# Patient Record
Sex: MALE | Race: WHITE | NOT HISPANIC OR LATINO | Employment: OTHER | URBAN - METROPOLITAN AREA
[De-identification: names, ages, dates, MRNs, and addresses within clinical notes are randomized per-mention and may not be internally consistent; named-entity substitution may affect disease eponyms.]

---

## 2023-08-28 PROBLEM — I10 HYPERTENSION: Status: ACTIVE | Noted: 2023-08-28

## 2023-08-28 PROBLEM — M17.9 OSTEOARTHRITIS OF KNEE: Status: ACTIVE | Noted: 2023-08-28

## 2023-08-28 PROBLEM — E11.9 TYPE 2 DIABETES MELLITUS (MULTI): Status: ACTIVE | Noted: 2023-08-28

## 2023-08-28 PROBLEM — E78.5 HYPERLIPIDEMIA: Status: ACTIVE | Noted: 2023-08-28

## 2023-08-28 PROBLEM — N52.9 ERECTILE DYSFUNCTION: Status: ACTIVE | Noted: 2023-08-28

## 2023-08-28 RX ORDER — GLIMEPIRIDE 2 MG/1
1 TABLET ORAL 2 TIMES DAILY
COMMUNITY
End: 2023-12-18

## 2023-08-28 RX ORDER — CHOLECALCIFEROL (VITAMIN D3) 50 MCG
1 TABLET ORAL
COMMUNITY
Start: 2011-08-16

## 2023-08-28 RX ORDER — ATORVASTATIN CALCIUM 40 MG/1
1 TABLET, FILM COATED ORAL DAILY
COMMUNITY
End: 2024-02-14

## 2023-08-28 RX ORDER — IRBESARTAN 150 MG/1
1 TABLET ORAL DAILY
COMMUNITY
End: 2024-01-04

## 2023-08-28 RX ORDER — FOLIC ACID 0.8 MG
TABLET ORAL DAILY
COMMUNITY

## 2023-08-28 RX ORDER — MELOXICAM 7.5 MG/1
1 TABLET ORAL DAILY PRN
COMMUNITY
End: 2023-12-27

## 2023-08-28 RX ORDER — FLUTICASONE PROPIONATE 50 MCG
2 SPRAY, SUSPENSION (ML) NASAL DAILY
COMMUNITY
Start: 2018-12-11 | End: 2024-05-21 | Stop reason: DRUGHIGH

## 2023-08-28 RX ORDER — METFORMIN HYDROCHLORIDE 500 MG/1
2 TABLET, EXTENDED RELEASE ORAL DAILY
COMMUNITY
End: 2023-12-15

## 2023-09-08 PROBLEM — J30.9 ALLERGIC RHINITIS: Status: ACTIVE | Noted: 2023-09-08

## 2023-09-08 PROBLEM — Z80.0 FAMILY HISTORY OF COLON CANCER: Status: ACTIVE | Noted: 2023-09-08

## 2023-11-21 ENCOUNTER — OFFICE VISIT (OUTPATIENT)
Dept: PRIMARY CARE | Facility: CLINIC | Age: 63
End: 2023-11-21
Payer: COMMERCIAL

## 2023-11-21 VITALS
WEIGHT: 232 LBS | BODY MASS INDEX: 32.48 KG/M2 | OXYGEN SATURATION: 97 % | HEIGHT: 71 IN | SYSTOLIC BLOOD PRESSURE: 120 MMHG | HEART RATE: 75 BPM | DIASTOLIC BLOOD PRESSURE: 78 MMHG | TEMPERATURE: 98.5 F

## 2023-11-21 DIAGNOSIS — E78.2 MIXED HYPERLIPIDEMIA: Chronic | ICD-10-CM

## 2023-11-21 DIAGNOSIS — I10 PRIMARY HYPERTENSION: Chronic | ICD-10-CM

## 2023-11-21 DIAGNOSIS — Z12.5 SCREENING FOR PROSTATE CANCER: ICD-10-CM

## 2023-11-21 DIAGNOSIS — Z00.00 ROUTINE MEDICAL EXAM: Primary | ICD-10-CM

## 2023-11-21 DIAGNOSIS — E11.9 TYPE 2 DIABETES MELLITUS WITHOUT COMPLICATION, WITHOUT LONG-TERM CURRENT USE OF INSULIN (MULTI): ICD-10-CM

## 2023-11-21 PROBLEM — Z80.0 FAMILY HISTORY OF COLON CANCER: Chronic | Status: ACTIVE | Noted: 2023-09-08

## 2023-11-21 PROBLEM — J30.9 ALLERGIC RHINITIS: Chronic | Status: ACTIVE | Noted: 2023-09-08

## 2023-11-21 PROBLEM — E78.5 HYPERLIPIDEMIA: Chronic | Status: ACTIVE | Noted: 2023-08-28

## 2023-11-21 PROCEDURE — 4010F ACE/ARB THERAPY RXD/TAKEN: CPT | Performed by: FAMILY MEDICINE

## 2023-11-21 PROCEDURE — 3078F DIAST BP <80 MM HG: CPT | Performed by: FAMILY MEDICINE

## 2023-11-21 PROCEDURE — 90686 IIV4 VACC NO PRSV 0.5 ML IM: CPT | Performed by: FAMILY MEDICINE

## 2023-11-21 PROCEDURE — 83036 HEMOGLOBIN GLYCOSYLATED A1C: CPT | Performed by: FAMILY MEDICINE

## 2023-11-21 PROCEDURE — 1036F TOBACCO NON-USER: CPT | Performed by: FAMILY MEDICINE

## 2023-11-21 PROCEDURE — 99396 PREV VISIT EST AGE 40-64: CPT | Performed by: FAMILY MEDICINE

## 2023-11-21 PROCEDURE — 3074F SYST BP LT 130 MM HG: CPT | Performed by: FAMILY MEDICINE

## 2023-11-21 ASSESSMENT — LIFESTYLE VARIABLES
AUDIT-C TOTAL SCORE: 2
SKIP TO QUESTIONS 9-10: 0
HOW MANY STANDARD DRINKS CONTAINING ALCOHOL DO YOU HAVE ON A TYPICAL DAY: 1 OR 2
HOW OFTEN DO YOU HAVE A DRINK CONTAINING ALCOHOL: MONTHLY OR LESS
HOW OFTEN DO YOU HAVE SIX OR MORE DRINKS ON ONE OCCASION: LESS THAN MONTHLY

## 2023-11-21 ASSESSMENT — ENCOUNTER SYMPTOMS
DEPRESSION: 0
COUGH: 0
PALPITATIONS: 0
ABDOMINAL DISTENTION: 0
LOSS OF SENSATION IN FEET: 0
OCCASIONAL FEELINGS OF UNSTEADINESS: 0
SHORTNESS OF BREATH: 0

## 2023-11-21 ASSESSMENT — PAIN SCALES - GENERAL: PAINLEVEL: 0-NO PAIN

## 2023-11-21 ASSESSMENT — PATIENT HEALTH QUESTIONNAIRE - PHQ9
2. FEELING DOWN, DEPRESSED OR HOPELESS: NOT AT ALL
SUM OF ALL RESPONSES TO PHQ9 QUESTIONS 1 AND 2: 0
1. LITTLE INTEREST OR PLEASURE IN DOING THINGS: NOT AT ALL

## 2023-11-21 NOTE — PROGRESS NOTES
"Subjective   Patient ID: Felice Mckenna is a 63 y.o. male who presents for No chief complaint on file..    HPI   DM2:   -Chronic , stable  -A1c today: 6.5  - Frequency of blood glucose monitoring: daily prn  -Hypoglycemic episodes: infrequent  -Concerns:  none  -Tolerating meds: yes  -Eye Exam: (recommend yearly)1 /23  -Foot Exam: (recommend yearly)  11/21/23  Hypertension :  -Patient is here for follow-up hypertension: chronic , stable  -Tolerating medications without side effects  -Patient denies chest pain.   Dyslipidemia  -Patient presents for follow up chronic ,stable problem  -Concerns: none   -Treatment:  atorvistatin    Review of Systems   Respiratory:  Negative for cough and shortness of breath.    Cardiovascular:  Negative for chest pain and palpitations.   Gastrointestinal:  Negative for abdominal distention.       Objective   /78 (BP Location: Left arm)   Pulse 75   Temp 36.9 °C (98.5 °F) (Temporal)   Ht 1.803 m (5' 11\")   Wt 105 kg (232 lb)   SpO2 97%   BMI 32.36 kg/m²     Physical Exam  Vitals reviewed.   Constitutional:       Appearance: Normal appearance.   Cardiovascular:      Rate and Rhythm: Normal rate and regular rhythm.      Pulses:           Dorsalis pedis pulses are 2+ on the right side and 2+ on the left side.        Posterior tibial pulses are 2+ on the right side and 2+ on the left side.      Heart sounds: Normal heart sounds. No murmur heard.  Pulmonary:      Breath sounds: Normal breath sounds.   Abdominal:      General: Abdomen is flat. Bowel sounds are normal.      Palpations: Abdomen is soft.   Musculoskeletal:         General: No swelling.   Feet:      Right foot:      Protective Sensation: 6 sites tested.        Skin integrity: Skin integrity normal.      Left foot:      Protective Sensation: 6 sites tested.        Skin integrity: Skin integrity normal.   Neurological:      Mental Status: He is alert.         Assessment/Plan   Diagnoses and all orders for this " visit:  Routine medical exam  -     Urinalysis with Reflex Microscopic and Culture; Future  -     Albumin , Urine Random; Future  -     Comprehensive Metabolic Panel; Future  -     CBC and Auto Differential; Future  -     TSH with reflex to Free T4 if abnormal; Future  -     Lipid Panel; Future  Type 2 diabetes mellitus without complication, without long-term current use of insulin (CMS/HCC)  -     POCT glycosylated hemoglobin (Hb A1C) manually resulted  -     Urinalysis with Reflex Microscopic and Culture; Future  -     Albumin , Urine Random; Future  -     Comprehensive Metabolic Panel; Future  -     CBC and Auto Differential; Future  -     TSH with reflex to Free T4 if abnormal; Future  -     Lipid Panel; Future  Mixed hyperlipidemia  Primary hypertension  Screening for prostate cancer  -     Prostate Specific Antigen, Screen; Future  Other orders  -     Flu vaccine (IIV4) age 6 months and greater, preservative free    Rto 6mo dm2  Cont meds as directed

## 2023-11-22 LAB — POC HEMOGLOBIN A1C: 6.5 % (ref 4.2–6.5)

## 2023-12-15 DIAGNOSIS — E11.9 TYPE 2 DIABETES MELLITUS WITHOUT COMPLICATION, WITHOUT LONG-TERM CURRENT USE OF INSULIN (MULTI): Primary | ICD-10-CM

## 2023-12-15 RX ORDER — METFORMIN HYDROCHLORIDE 500 MG/1
1000 TABLET, EXTENDED RELEASE ORAL DAILY
Qty: 180 TABLET | Refills: 3 | Status: SHIPPED | OUTPATIENT
Start: 2023-12-15

## 2023-12-18 DIAGNOSIS — E11.9 TYPE 2 DIABETES MELLITUS WITHOUT COMPLICATION, WITHOUT LONG-TERM CURRENT USE OF INSULIN (MULTI): Primary | ICD-10-CM

## 2023-12-18 RX ORDER — GLIMEPIRIDE 2 MG/1
2 TABLET ORAL 2 TIMES DAILY
Qty: 60 TABLET | Refills: 5 | Status: SHIPPED | OUTPATIENT
Start: 2023-12-18 | End: 2024-06-06

## 2023-12-27 DIAGNOSIS — M17.10 PRIMARY OSTEOARTHRITIS OF KNEE, UNSPECIFIED LATERALITY: Primary | ICD-10-CM

## 2023-12-27 RX ORDER — MELOXICAM 7.5 MG/1
TABLET ORAL
Qty: 30 TABLET | Refills: 11 | Status: SHIPPED | OUTPATIENT
Start: 2023-12-27

## 2024-01-04 DIAGNOSIS — I10 PRIMARY HYPERTENSION: Primary | Chronic | ICD-10-CM

## 2024-01-04 RX ORDER — IRBESARTAN 150 MG/1
150 TABLET ORAL DAILY
Qty: 90 TABLET | Refills: 3 | Status: SHIPPED | OUTPATIENT
Start: 2024-01-04

## 2024-02-09 ENCOUNTER — LAB (OUTPATIENT)
Dept: LAB | Facility: LAB | Age: 64
End: 2024-02-09
Payer: COMMERCIAL

## 2024-02-09 DIAGNOSIS — Z12.5 SCREENING FOR PROSTATE CANCER: ICD-10-CM

## 2024-02-09 DIAGNOSIS — E11.9 TYPE 2 DIABETES MELLITUS WITHOUT COMPLICATION, WITHOUT LONG-TERM CURRENT USE OF INSULIN (MULTI): ICD-10-CM

## 2024-02-09 DIAGNOSIS — Z00.00 ROUTINE MEDICAL EXAM: ICD-10-CM

## 2024-02-09 LAB
ALBUMIN SERPL BCP-MCNC: 4.4 G/DL (ref 3.4–5)
ALP SERPL-CCNC: 103 U/L (ref 33–136)
ALT SERPL W P-5'-P-CCNC: 35 U/L (ref 10–52)
ANION GAP SERPL CALC-SCNC: 14 MMOL/L (ref 10–20)
APPEARANCE UR: CLEAR
AST SERPL W P-5'-P-CCNC: 20 U/L (ref 9–39)
BASOPHILS # BLD AUTO: 0.06 X10*3/UL (ref 0–0.1)
BASOPHILS NFR BLD AUTO: 1 %
BILIRUB SERPL-MCNC: 0.5 MG/DL (ref 0–1.2)
BILIRUB UR STRIP.AUTO-MCNC: NEGATIVE MG/DL
BUN SERPL-MCNC: 23 MG/DL (ref 6–23)
CALCIUM SERPL-MCNC: 9.6 MG/DL (ref 8.6–10.3)
CHLORIDE SERPL-SCNC: 103 MMOL/L (ref 98–107)
CHOLEST SERPL-MCNC: 150 MG/DL (ref 0–199)
CHOLESTEROL/HDL RATIO: 2.8
CO2 SERPL-SCNC: 25 MMOL/L (ref 21–32)
COLOR UR: YELLOW
CREAT SERPL-MCNC: 0.98 MG/DL (ref 0.5–1.3)
EGFRCR SERPLBLD CKD-EPI 2021: 87 ML/MIN/1.73M*2
EOSINOPHIL # BLD AUTO: 0.18 X10*3/UL (ref 0–0.7)
EOSINOPHIL NFR BLD AUTO: 3 %
ERYTHROCYTE [DISTWIDTH] IN BLOOD BY AUTOMATED COUNT: 12.5 % (ref 11.5–14.5)
GLUCOSE SERPL-MCNC: 165 MG/DL (ref 74–99)
GLUCOSE UR STRIP.AUTO-MCNC: NEGATIVE MG/DL
HCT VFR BLD AUTO: 44.4 % (ref 41–52)
HDLC SERPL-MCNC: 53.8 MG/DL
HGB BLD-MCNC: 15.4 G/DL (ref 13.5–17.5)
HOLD SPECIMEN: NORMAL
IMM GRANULOCYTES # BLD AUTO: 0.02 X10*3/UL (ref 0–0.7)
IMM GRANULOCYTES NFR BLD AUTO: 0.3 % (ref 0–0.9)
KETONES UR STRIP.AUTO-MCNC: ABNORMAL MG/DL
LDLC SERPL CALC-MCNC: 78 MG/DL
LEUKOCYTE ESTERASE UR QL STRIP.AUTO: NEGATIVE
LYMPHOCYTES # BLD AUTO: 2.1 X10*3/UL (ref 1.2–4.8)
LYMPHOCYTES NFR BLD AUTO: 34.8 %
MCH RBC QN AUTO: 31 PG (ref 26–34)
MCHC RBC AUTO-ENTMCNC: 34.7 G/DL (ref 32–36)
MCV RBC AUTO: 89 FL (ref 80–100)
MONOCYTES # BLD AUTO: 0.64 X10*3/UL (ref 0.1–1)
MONOCYTES NFR BLD AUTO: 10.6 %
NEUTROPHILS # BLD AUTO: 3.03 X10*3/UL (ref 1.2–7.7)
NEUTROPHILS NFR BLD AUTO: 50.3 %
NITRITE UR QL STRIP.AUTO: NEGATIVE
NON HDL CHOLESTEROL: 96 MG/DL (ref 0–149)
NRBC BLD-RTO: 0 /100 WBCS (ref 0–0)
PH UR STRIP.AUTO: 5 [PH]
PLATELET # BLD AUTO: 209 X10*3/UL (ref 150–450)
POTASSIUM SERPL-SCNC: 4.3 MMOL/L (ref 3.5–5.3)
PROT SERPL-MCNC: 6.9 G/DL (ref 6.4–8.2)
PROT UR STRIP.AUTO-MCNC: NEGATIVE MG/DL
RBC # BLD AUTO: 4.97 X10*6/UL (ref 4.5–5.9)
RBC # UR STRIP.AUTO: NEGATIVE /UL
SODIUM SERPL-SCNC: 138 MMOL/L (ref 136–145)
SP GR UR STRIP.AUTO: 1.02
TRIGL SERPL-MCNC: 91 MG/DL (ref 0–149)
TSH SERPL-ACNC: 1.96 MIU/L (ref 0.44–3.98)
UROBILINOGEN UR STRIP.AUTO-MCNC: <2 MG/DL
VLDL: 18 MG/DL (ref 0–40)
WBC # BLD AUTO: 6 X10*3/UL (ref 4.4–11.3)

## 2024-02-09 PROCEDURE — 80061 LIPID PANEL: CPT

## 2024-02-09 PROCEDURE — 81003 URINALYSIS AUTO W/O SCOPE: CPT

## 2024-02-09 PROCEDURE — 80053 COMPREHEN METABOLIC PANEL: CPT

## 2024-02-09 PROCEDURE — 82570 ASSAY OF URINE CREATININE: CPT

## 2024-02-09 PROCEDURE — 84443 ASSAY THYROID STIM HORMONE: CPT

## 2024-02-09 PROCEDURE — 82043 UR ALBUMIN QUANTITATIVE: CPT

## 2024-02-09 PROCEDURE — 84153 ASSAY OF PSA TOTAL: CPT

## 2024-02-09 PROCEDURE — 85025 COMPLETE CBC W/AUTO DIFF WBC: CPT

## 2024-02-10 LAB
CREAT UR-MCNC: 119.4 MG/DL (ref 20–370)
MICROALBUMIN UR-MCNC: <7 MG/L
MICROALBUMIN/CREAT UR: NORMAL MG/G{CREAT}
PSA SERPL-MCNC: 0.7 NG/ML

## 2024-02-14 DIAGNOSIS — E78.2 MIXED HYPERLIPIDEMIA: Primary | Chronic | ICD-10-CM

## 2024-02-14 RX ORDER — ATORVASTATIN CALCIUM 40 MG/1
40 TABLET, FILM COATED ORAL DAILY
Qty: 90 TABLET | Refills: 1 | Status: SHIPPED | OUTPATIENT
Start: 2024-02-14

## 2024-05-21 ENCOUNTER — OFFICE VISIT (OUTPATIENT)
Dept: PRIMARY CARE | Facility: CLINIC | Age: 64
End: 2024-05-21
Payer: COMMERCIAL

## 2024-05-21 VITALS
TEMPERATURE: 98.4 F | WEIGHT: 234.6 LBS | OXYGEN SATURATION: 98 % | HEART RATE: 88 BPM | SYSTOLIC BLOOD PRESSURE: 134 MMHG | DIASTOLIC BLOOD PRESSURE: 86 MMHG | BODY MASS INDEX: 32.84 KG/M2 | HEIGHT: 71 IN

## 2024-05-21 DIAGNOSIS — E11.9 TYPE 2 DIABETES MELLITUS WITHOUT COMPLICATION, WITHOUT LONG-TERM CURRENT USE OF INSULIN (MULTI): ICD-10-CM

## 2024-05-21 DIAGNOSIS — E78.2 MIXED HYPERLIPIDEMIA: Chronic | ICD-10-CM

## 2024-05-21 DIAGNOSIS — I10 PRIMARY HYPERTENSION: Primary | Chronic | ICD-10-CM

## 2024-05-21 PROBLEM — N52.9 ERECTILE DYSFUNCTION: Chronic | Status: ACTIVE | Noted: 2023-08-28

## 2024-05-21 LAB — POC HEMOGLOBIN A1C: 7.8 % (ref 4.2–6.5)

## 2024-05-21 PROCEDURE — 4010F ACE/ARB THERAPY RXD/TAKEN: CPT | Performed by: FAMILY MEDICINE

## 2024-05-21 PROCEDURE — 1036F TOBACCO NON-USER: CPT | Performed by: FAMILY MEDICINE

## 2024-05-21 PROCEDURE — 99214 OFFICE O/P EST MOD 30 MIN: CPT | Performed by: FAMILY MEDICINE

## 2024-05-21 PROCEDURE — 83036 HEMOGLOBIN GLYCOSYLATED A1C: CPT | Mod: 91 | Performed by: FAMILY MEDICINE

## 2024-05-21 PROCEDURE — 3062F POS MACROALBUMINURIA REV: CPT | Performed by: FAMILY MEDICINE

## 2024-05-21 PROCEDURE — 3048F LDL-C <100 MG/DL: CPT | Performed by: FAMILY MEDICINE

## 2024-05-21 PROCEDURE — 3075F SYST BP GE 130 - 139MM HG: CPT | Performed by: FAMILY MEDICINE

## 2024-05-21 PROCEDURE — 3079F DIAST BP 80-89 MM HG: CPT | Performed by: FAMILY MEDICINE

## 2024-05-21 RX ORDER — FLUTICASONE PROPIONATE 50 MCG
2 SPRAY, SUSPENSION (ML) NASAL DAILY PRN
Qty: 16 G | Refills: 1 | Status: SHIPPED | OUTPATIENT
Start: 2024-05-21

## 2024-05-21 ASSESSMENT — PATIENT HEALTH QUESTIONNAIRE - PHQ9
SUM OF ALL RESPONSES TO PHQ9 QUESTIONS 1 AND 2: 0
1. LITTLE INTEREST OR PLEASURE IN DOING THINGS: NOT AT ALL
2. FEELING DOWN, DEPRESSED OR HOPELESS: NOT AT ALL

## 2024-05-21 ASSESSMENT — ENCOUNTER SYMPTOMS
DEPRESSION: 0
LOSS OF SENSATION IN FEET: 0
SHORTNESS OF BREATH: 0
ABDOMINAL DISTENTION: 0
COUGH: 0
PALPITATIONS: 0
OCCASIONAL FEELINGS OF UNSTEADINESS: 0

## 2024-05-21 ASSESSMENT — COLUMBIA-SUICIDE SEVERITY RATING SCALE - C-SSRS
1. IN THE PAST MONTH, HAVE YOU WISHED YOU WERE DEAD OR WISHED YOU COULD GO TO SLEEP AND NOT WAKE UP?: NO
6. HAVE YOU EVER DONE ANYTHING, STARTED TO DO ANYTHING, OR PREPARED TO DO ANYTHING TO END YOUR LIFE?: NO
2. HAVE YOU ACTUALLY HAD ANY THOUGHTS OF KILLING YOURSELF?: NO

## 2024-05-21 ASSESSMENT — PAIN SCALES - GENERAL: PAINLEVEL: 0-NO PAIN

## 2024-05-21 NOTE — PROGRESS NOTES
"Subjective   Patient ID: Felice Mckenna is a 63 y.o. male who presents for No chief complaint on file..    HPI   DM2:   -Chronic , stable  -A1c today: 7.8  - Frequency of blood glucose monitorin-2 /wk  fbg 150  -Hypoglycemic episodes: infrequent  -Concerns:  none  -Tolerating meds: yes  Hypertension :   -Patient is here for follow-up hypertension: chronic , stable  -Tolerating medications without side effects  Dyslipidemia  -Patient presents for follow up chronic ,stable problem  -Concerns: none   -Treatment:          Review of Systems   Respiratory:  Negative for cough and shortness of breath.    Cardiovascular:  Negative for chest pain and palpitations.   Gastrointestinal:  Negative for abdominal distention.       Objective   /86   Pulse 88   Temp 36.9 °C (98.4 °F) (Temporal)   Ht 1.803 m (5' 11\")   Wt 106 kg (234 lb 9.6 oz)   SpO2 98%   BMI 32.72 kg/m²     Physical Exam  Vitals reviewed.   Constitutional:       Appearance: Normal appearance.   Cardiovascular:      Rate and Rhythm: Normal rate and regular rhythm.      Heart sounds: Normal heart sounds. No murmur heard.  Pulmonary:      Breath sounds: Normal breath sounds.   Abdominal:      General: Abdomen is flat. Bowel sounds are normal.      Palpations: Abdomen is soft.   Musculoskeletal:         General: No swelling.   Neurological:      Mental Status: He is alert.         Assessment/Plan   Diagnoses and all orders for this visit:  Primary hypertension  Type 2 diabetes mellitus without complication, without long-term current use of insulin (Multi)  -     POCT glycosylated hemoglobin (Hb A1C) manually resulted  Mixed hyperlipidemia  Other orders  -     Follow Up In Primary Care - Established  -     fluticasone (Flonase) 50 mcg/actuation nasal spray; Administer 2 sprays into each nostril once daily as needed for rhinitis.  -     Follow Up In Primary Care - Health Maintenance; Future       Rto 6mo  Cont meds  A1c is slightly elevated today " compared to last visit  Recommend he increase his exercise and recommend weight loss  Did advise and recommend a change in therapy of his medications he wanted to hold off and see what happens over the next 6 months.

## 2024-06-06 DIAGNOSIS — E11.9 TYPE 2 DIABETES MELLITUS WITHOUT COMPLICATION, WITHOUT LONG-TERM CURRENT USE OF INSULIN (MULTI): ICD-10-CM

## 2024-06-06 RX ORDER — GLIMEPIRIDE 2 MG/1
2 TABLET ORAL 2 TIMES DAILY
Qty: 60 TABLET | Refills: 5 | Status: SHIPPED | OUTPATIENT
Start: 2024-06-06

## 2024-11-26 ENCOUNTER — OFFICE VISIT (OUTPATIENT)
Dept: PRIMARY CARE | Facility: CLINIC | Age: 64
End: 2024-11-26
Payer: COMMERCIAL

## 2024-11-26 VITALS
OXYGEN SATURATION: 98 % | SYSTOLIC BLOOD PRESSURE: 136 MMHG | TEMPERATURE: 98.1 F | HEIGHT: 71 IN | DIASTOLIC BLOOD PRESSURE: 72 MMHG | WEIGHT: 239 LBS | BODY MASS INDEX: 33.46 KG/M2 | HEART RATE: 95 BPM

## 2024-11-26 DIAGNOSIS — I10 PRIMARY HYPERTENSION: Chronic | ICD-10-CM

## 2024-11-26 DIAGNOSIS — Z00.00 ROUTINE MEDICAL EXAM: ICD-10-CM

## 2024-11-26 DIAGNOSIS — E11.9 TYPE 2 DIABETES MELLITUS WITHOUT COMPLICATION, WITHOUT LONG-TERM CURRENT USE OF INSULIN (MULTI): ICD-10-CM

## 2024-11-26 DIAGNOSIS — Z23 ENCOUNTER FOR IMMUNIZATION: ICD-10-CM

## 2024-11-26 DIAGNOSIS — Z23 ENCOUNTER FOR PREVNAR PNEUMOCOCCAL VACCINATION: Primary | ICD-10-CM

## 2024-11-26 DIAGNOSIS — Z12.5 SCREENING FOR PROSTATE CANCER: ICD-10-CM

## 2024-11-26 DIAGNOSIS — Z80.0 FAMILY HISTORY OF COLON CANCER: Chronic | ICD-10-CM

## 2024-11-26 DIAGNOSIS — E78.2 MIXED HYPERLIPIDEMIA: Chronic | ICD-10-CM

## 2024-11-26 LAB — POC HEMOGLOBIN A1C: 7.6 % (ref 4.2–6.5)

## 2024-11-26 PROCEDURE — 3075F SYST BP GE 130 - 139MM HG: CPT | Performed by: FAMILY MEDICINE

## 2024-11-26 PROCEDURE — 90471 IMMUNIZATION ADMIN: CPT | Performed by: FAMILY MEDICINE

## 2024-11-26 PROCEDURE — 3078F DIAST BP <80 MM HG: CPT | Performed by: FAMILY MEDICINE

## 2024-11-26 PROCEDURE — 90656 IIV3 VACC NO PRSV 0.5 ML IM: CPT | Performed by: FAMILY MEDICINE

## 2024-11-26 PROCEDURE — 1036F TOBACCO NON-USER: CPT | Performed by: FAMILY MEDICINE

## 2024-11-26 PROCEDURE — 4010F ACE/ARB THERAPY RXD/TAKEN: CPT | Performed by: FAMILY MEDICINE

## 2024-11-26 PROCEDURE — 3062F POS MACROALBUMINURIA REV: CPT | Performed by: FAMILY MEDICINE

## 2024-11-26 PROCEDURE — 3048F LDL-C <100 MG/DL: CPT | Performed by: FAMILY MEDICINE

## 2024-11-26 PROCEDURE — 83036 HEMOGLOBIN GLYCOSYLATED A1C: CPT | Performed by: FAMILY MEDICINE

## 2024-11-26 PROCEDURE — 90677 PCV20 VACCINE IM: CPT | Performed by: FAMILY MEDICINE

## 2024-11-26 PROCEDURE — 90472 IMMUNIZATION ADMIN EACH ADD: CPT | Performed by: FAMILY MEDICINE

## 2024-11-26 PROCEDURE — 3008F BODY MASS INDEX DOCD: CPT | Performed by: FAMILY MEDICINE

## 2024-11-26 PROCEDURE — 99396 PREV VISIT EST AGE 40-64: CPT | Performed by: FAMILY MEDICINE

## 2024-11-26 ASSESSMENT — ENCOUNTER SYMPTOMS
ABDOMINAL DISTENTION: 0
LOSS OF SENSATION IN FEET: 0
OCCASIONAL FEELINGS OF UNSTEADINESS: 0
PALPITATIONS: 0
COUGH: 0
SHORTNESS OF BREATH: 0
DEPRESSION: 0

## 2024-11-26 ASSESSMENT — LIFESTYLE VARIABLES
SKIP TO QUESTIONS 9-10: 0
HOW OFTEN DO YOU HAVE A DRINK CONTAINING ALCOHOL: 2-3 TIMES A WEEK
HOW MANY STANDARD DRINKS CONTAINING ALCOHOL DO YOU HAVE ON A TYPICAL DAY: 1 OR 2
AUDIT-C TOTAL SCORE: 7
HOW OFTEN DO YOU HAVE SIX OR MORE DRINKS ON ONE OCCASION: DAILY OR ALMOST DAILY

## 2024-11-26 ASSESSMENT — PAIN SCALES - GENERAL: PAINLEVEL_OUTOF10: 0-NO PAIN

## 2024-11-26 ASSESSMENT — PATIENT HEALTH QUESTIONNAIRE - PHQ9
SUM OF ALL RESPONSES TO PHQ9 QUESTIONS 1 AND 2: 0
2. FEELING DOWN, DEPRESSED OR HOPELESS: NOT AT ALL
1. LITTLE INTEREST OR PLEASURE IN DOING THINGS: NOT AT ALL

## 2024-11-26 NOTE — PROGRESS NOTES
"Subjective   Patient ID: Felice Mckenna is a 64 y.o. male who presents for Annual Exam.    HPI   DM2:   -Chronic , stable  -A1c today: 7.6,will increase exercise  - Frequency of blood glucose monitoring:  -Hypoglycemic episodes: infrequent  -Concerns:  none  -Tolerating meds: yes  -Eye Exam: (recommend yearly) 9/2024  -Foot Exam: (recommend yearly) 11/2024    Dyslipidemia  -Patient presents for follow up chronic ,stable problem  -Concerns: none   -Treatment: Atorvastatin 40mg    Hypertension :   -Patient is here for follow-up hypertension: chronic , stable  -Tolerating medications without side effects    Review of Systems   Respiratory:  Negative for cough and shortness of breath.    Cardiovascular:  Negative for chest pain and palpitations.   Gastrointestinal:  Negative for abdominal distention.       Objective   /72   Pulse 95   Temp 36.7 °C (98.1 °F)   Ht 1.803 m (5' 11\")   Wt 108 kg (239 lb)   SpO2 98%   BMI 33.33 kg/m²     Physical Exam  Vitals reviewed.   Constitutional:       Appearance: Normal appearance.   HENT:      Right Ear: Tympanic membrane, ear canal and external ear normal.      Left Ear: Tympanic membrane, ear canal and external ear normal.      Nose: Nose normal.      Mouth/Throat:      Mouth: Mucous membranes are moist.   Eyes:      Extraocular Movements: Extraocular movements intact.      Conjunctiva/sclera: Conjunctivae normal.      Pupils: Pupils are equal, round, and reactive to light.   Cardiovascular:      Rate and Rhythm: Normal rate and regular rhythm.      Pulses: Normal pulses.           Dorsalis pedis pulses are 2+ on the right side and 2+ on the left side.        Posterior tibial pulses are 2+ on the right side and 2+ on the left side.      Heart sounds: Normal heart sounds.   Pulmonary:      Breath sounds: Normal breath sounds.   Abdominal:      General: Abdomen is flat. Bowel sounds are normal.      Palpations: Abdomen is soft.   Genitourinary:     Penis: Normal.       " Testes: Normal.      Prostate: Enlarged. Not tender and no nodules present.      Rectum: Normal.   Musculoskeletal:         General: Normal range of motion.      Cervical back: Neck supple.   Feet:      Right foot:      Protective Sensation: 6 sites tested.        Skin integrity: Skin integrity normal.      Left foot:      Protective Sensation: 6 sites tested.        Skin integrity: Skin integrity normal.   Skin:     General: Skin is warm.   Neurological:      General: No focal deficit present.      Mental Status: He is alert and oriented to person, place, and time.      Cranial Nerves: Cranial nerves 2-12 are intact.   Psychiatric:         Attention and Perception: Attention normal.         Mood and Affect: Mood normal.         Assessment/Plan   Diagnoses and all orders for this visit:  Encounter for Prevnar pneumococcal vaccination  Encounter for immunization  -     Flu vaccine, trivalent, preservative free, age 6 months and greater (Fluarix/Fluzone/Flulaval)  -     Pneumococcal conjugate vaccine, 20-valent (PREVNAR 20)  Type 2 diabetes mellitus without complication, without long-term current use of insulin (Multi)  -     POCT glycosylated hemoglobin (Hb A1C) manually resulted  -     Urinalysis with Reflex Microscopic; Future  -     Albumin-Creatinine Ratio, Urine Random; Future  -     Comprehensive Metabolic Panel; Future  -     CBC and Auto Differential; Future  -     TSH with reflex to Free T4 if abnormal; Future  -     Lipid Panel; Future  -     Prostate Specific Antigen, Screen; Future  Routine medical exam  -     Pneumococcal conjugate vaccine, 20-valent (PREVNAR 20)  -     Urinalysis with Reflex Microscopic; Future  -     Albumin-Creatinine Ratio, Urine Random; Future  -     Comprehensive Metabolic Panel; Future  -     CBC and Auto Differential; Future  -     TSH with reflex to Free T4 if abnormal; Future  -     Lipid Panel; Future  -     Prostate Specific Antigen, Screen; Future  Primary  hypertension  Family history of colon cancer  -     Referral to Gastroenterology; Future  Mixed hyperlipidemia  Screening for prostate cancer  -     Prostate Specific Antigen, Screen; Future  Other orders  -     Follow Up In Primary Care - Health Maintenance       Rto 6mo  Weight loss  Cont meds  C scope inf summer

## 2024-11-30 DIAGNOSIS — E11.9 TYPE 2 DIABETES MELLITUS WITHOUT COMPLICATION, WITHOUT LONG-TERM CURRENT USE OF INSULIN (MULTI): ICD-10-CM

## 2024-12-01 RX ORDER — GLIMEPIRIDE 2 MG/1
2 TABLET ORAL 2 TIMES DAILY
Qty: 180 TABLET | Refills: 1 | Status: SHIPPED | OUTPATIENT
Start: 2024-12-01

## 2024-12-02 DIAGNOSIS — E11.9 TYPE 2 DIABETES MELLITUS WITHOUT COMPLICATION, WITHOUT LONG-TERM CURRENT USE OF INSULIN (MULTI): ICD-10-CM

## 2024-12-02 RX ORDER — METFORMIN HYDROCHLORIDE 500 MG/1
1000 TABLET, EXTENDED RELEASE ORAL DAILY
Qty: 180 TABLET | Refills: 3 | Status: SHIPPED | OUTPATIENT
Start: 2024-12-02

## 2024-12-31 DIAGNOSIS — I10 PRIMARY HYPERTENSION: Chronic | ICD-10-CM

## 2024-12-31 DIAGNOSIS — M17.10 PRIMARY OSTEOARTHRITIS OF KNEE, UNSPECIFIED LATERALITY: ICD-10-CM

## 2024-12-31 RX ORDER — IRBESARTAN 150 MG/1
150 TABLET ORAL DAILY
Qty: 90 TABLET | Refills: 3 | Status: SHIPPED | OUTPATIENT
Start: 2024-12-31

## 2024-12-31 RX ORDER — MELOXICAM 7.5 MG/1
7.5 TABLET ORAL DAILY
Qty: 30 TABLET | Refills: 11 | Status: SHIPPED | OUTPATIENT
Start: 2024-12-31

## 2025-01-08 ENCOUNTER — OFFICE VISIT (OUTPATIENT)
Dept: PRIMARY CARE | Facility: CLINIC | Age: 65
End: 2025-01-08
Payer: COMMERCIAL

## 2025-01-08 VITALS
DIASTOLIC BLOOD PRESSURE: 89 MMHG | WEIGHT: 238.4 LBS | HEIGHT: 70 IN | SYSTOLIC BLOOD PRESSURE: 130 MMHG | TEMPERATURE: 97.7 F | OXYGEN SATURATION: 100 % | HEART RATE: 76 BPM | BODY MASS INDEX: 34.13 KG/M2

## 2025-01-08 DIAGNOSIS — H81.12 BENIGN PAROXYSMAL POSITIONAL VERTIGO OF LEFT EAR: Primary | ICD-10-CM

## 2025-01-08 PROCEDURE — 4010F ACE/ARB THERAPY RXD/TAKEN: CPT | Performed by: FAMILY MEDICINE

## 2025-01-08 PROCEDURE — 1036F TOBACCO NON-USER: CPT | Performed by: FAMILY MEDICINE

## 2025-01-08 PROCEDURE — 3075F SYST BP GE 130 - 139MM HG: CPT | Performed by: FAMILY MEDICINE

## 2025-01-08 PROCEDURE — 99213 OFFICE O/P EST LOW 20 MIN: CPT | Performed by: FAMILY MEDICINE

## 2025-01-08 PROCEDURE — 3008F BODY MASS INDEX DOCD: CPT | Performed by: FAMILY MEDICINE

## 2025-01-08 PROCEDURE — 3079F DIAST BP 80-89 MM HG: CPT | Performed by: FAMILY MEDICINE

## 2025-01-08 ASSESSMENT — ENCOUNTER SYMPTOMS
VOMITING: 0
DIZZINESS: 1
HEADACHES: 1
DEPRESSION: 0
DIAPHORESIS: 0
SWOLLEN GLANDS: 0
OCCASIONAL FEELINGS OF UNSTEADINESS: 0
NAUSEA: 1
LOSS OF SENSATION IN FEET: 0
SORE THROAT: 0
FEVER: 0

## 2025-01-08 ASSESSMENT — PATIENT HEALTH QUESTIONNAIRE - PHQ9
1. LITTLE INTEREST OR PLEASURE IN DOING THINGS: NOT AT ALL
2. FEELING DOWN, DEPRESSED OR HOPELESS: NOT AT ALL
SUM OF ALL RESPONSES TO PHQ9 QUESTIONS 1 AND 2: 0

## 2025-01-08 ASSESSMENT — PAIN SCALES - GENERAL: PAINLEVEL_OUTOF10: 0-NO PAIN

## 2025-01-08 ASSESSMENT — LIFESTYLE VARIABLES
HOW OFTEN DO YOU HAVE A DRINK CONTAINING ALCOHOL: MONTHLY OR LESS
HOW OFTEN DO YOU HAVE SIX OR MORE DRINKS ON ONE OCCASION: LESS THAN MONTHLY
SKIP TO QUESTIONS 9-10: 0
AUDIT-C TOTAL SCORE: 2
HOW MANY STANDARD DRINKS CONTAINING ALCOHOL DO YOU HAVE ON A TYPICAL DAY: 1 OR 2

## 2025-01-08 ASSESSMENT — COLUMBIA-SUICIDE SEVERITY RATING SCALE - C-SSRS
2. HAVE YOU ACTUALLY HAD ANY THOUGHTS OF KILLING YOURSELF?: NO
1. IN THE PAST MONTH, HAVE YOU WISHED YOU WERE DEAD OR WISHED YOU COULD GO TO SLEEP AND NOT WAKE UP?: NO
6. HAVE YOU EVER DONE ANYTHING, STARTED TO DO ANYTHING, OR PREPARED TO DO ANYTHING TO END YOUR LIFE?: NO

## 2025-01-08 NOTE — PROGRESS NOTES
"Subjective   Patient ID: Felice Mckenna is a 64 y.o. male who presents for Dizziness (X1 mth with nausea).    Dizziness  This is a new problem. The current episode started more than 1 month ago. Associated symptoms include headaches and nausea. Pertinent negatives include no diaphoresis, fever, rash, sore throat, swollen glands or vomiting. The treatment provided mild relief.      Intermittent symptoms of dizziness with change in head position, symptoms fatigue after less than 30 seconds, negative for chest pain or shortness of breath  Symptoms have been lasting for proxy about 1 month  Does notice that maybe when he changes or flips over in bed that these seem to trigger his symptoms  Review of Systems   Constitutional:  Negative for diaphoresis and fever.   HENT:  Negative for sore throat.    Gastrointestinal:  Positive for nausea. Negative for vomiting.   Skin:  Negative for rash.   Neurological:  Positive for dizziness and headaches.       Objective   /90   Pulse 76   Temp 36.5 °C (97.7 °F)   Ht 1.778 m (5' 10\")   Wt 108 kg (238 lb 6.4 oz)   SpO2 100%   BMI 34.21 kg/m²     Physical Exam  Vitals reviewed.   Constitutional:       Appearance: Normal appearance.   HENT:      Head: Normocephalic and atraumatic.      Jaw: There is normal jaw occlusion.      Right Ear: Tympanic membrane and ear canal normal.      Left Ear: Tympanic membrane and ear canal normal.      Mouth/Throat:      Mouth: Mucous membranes are moist.      Pharynx: Oropharynx is clear. Uvula midline.   Cardiovascular:      Rate and Rhythm: Normal rate and regular rhythm.      Heart sounds: Normal heart sounds. No murmur heard.  Pulmonary:      Breath sounds: Normal breath sounds.   Abdominal:      General: Abdomen is flat. Bowel sounds are normal.      Palpations: Abdomen is soft.   Musculoskeletal:         General: No swelling.   Neurological:      Mental Status: He is alert.     Positive [place]-Hallpike maneuver to the left  Negative [place] " Hile maneuver to the right  No signs of vertical horizontal or rotary nystagmus  Carotids were normal bilaterally      Assessment/Plan   Diagnoses and all orders for this visit:  Benign paroxysmal positional vertigo of left ear    Patient sent home with Epley's maneuvers  Return to office symptoms worsen  Counseled on warning signs and symptoms if they occur he is to come back and be seen  Continue his current medications

## 2025-01-24 ENCOUNTER — LAB (OUTPATIENT)
Dept: LAB | Facility: LAB | Age: 65
End: 2025-01-24
Payer: COMMERCIAL

## 2025-01-24 DIAGNOSIS — Z12.5 SCREENING FOR PROSTATE CANCER: ICD-10-CM

## 2025-01-24 DIAGNOSIS — Z00.00 ROUTINE MEDICAL EXAM: ICD-10-CM

## 2025-01-24 DIAGNOSIS — E11.9 TYPE 2 DIABETES MELLITUS WITHOUT COMPLICATION, WITHOUT LONG-TERM CURRENT USE OF INSULIN (MULTI): ICD-10-CM

## 2025-01-24 LAB
ALBUMIN SERPL BCP-MCNC: 4.4 G/DL (ref 3.4–5)
ALP SERPL-CCNC: 85 U/L (ref 33–136)
ALT SERPL W P-5'-P-CCNC: 44 U/L (ref 10–52)
ANION GAP SERPL CALC-SCNC: 13 MMOL/L (ref 10–20)
APPEARANCE UR: CLEAR
AST SERPL W P-5'-P-CCNC: 23 U/L (ref 9–39)
BASOPHILS # BLD AUTO: 0.04 X10*3/UL (ref 0–0.1)
BASOPHILS NFR BLD AUTO: 0.6 %
BILIRUB SERPL-MCNC: 0.6 MG/DL (ref 0–1.2)
BILIRUB UR STRIP.AUTO-MCNC: NEGATIVE MG/DL
BUN SERPL-MCNC: 22 MG/DL (ref 6–23)
CALCIUM SERPL-MCNC: 9.4 MG/DL (ref 8.6–10.3)
CHLORIDE SERPL-SCNC: 105 MMOL/L (ref 98–107)
CHOLEST SERPL-MCNC: 134 MG/DL (ref 0–199)
CHOLESTEROL/HDL RATIO: 3.2
CO2 SERPL-SCNC: 27 MMOL/L (ref 21–32)
COLOR UR: YELLOW
CREAT SERPL-MCNC: 1.03 MG/DL (ref 0.5–1.3)
CREAT UR-MCNC: 219 MG/DL (ref 20–370)
EGFRCR SERPLBLD CKD-EPI 2021: 82 ML/MIN/1.73M*2
EOSINOPHIL # BLD AUTO: 0.17 X10*3/UL (ref 0–0.7)
EOSINOPHIL NFR BLD AUTO: 2.7 %
ERYTHROCYTE [DISTWIDTH] IN BLOOD BY AUTOMATED COUNT: 12.2 % (ref 11.5–14.5)
GLUCOSE SERPL-MCNC: 211 MG/DL (ref 74–99)
GLUCOSE UR STRIP.AUTO-MCNC: ABNORMAL MG/DL
HCT VFR BLD AUTO: 45.6 % (ref 41–52)
HDLC SERPL-MCNC: 42.5 MG/DL
HGB BLD-MCNC: 15.6 G/DL (ref 13.5–17.5)
IMM GRANULOCYTES # BLD AUTO: 0.01 X10*3/UL (ref 0–0.7)
IMM GRANULOCYTES NFR BLD AUTO: 0.2 % (ref 0–0.9)
KETONES UR STRIP.AUTO-MCNC: NEGATIVE MG/DL
LDLC SERPL CALC-MCNC: 69 MG/DL
LEUKOCYTE ESTERASE UR QL STRIP.AUTO: NEGATIVE
LYMPHOCYTES # BLD AUTO: 2.05 X10*3/UL (ref 1.2–4.8)
LYMPHOCYTES NFR BLD AUTO: 32.3 %
MCH RBC QN AUTO: 31 PG (ref 26–34)
MCHC RBC AUTO-ENTMCNC: 34.2 G/DL (ref 32–36)
MCV RBC AUTO: 91 FL (ref 80–100)
MICROALBUMIN UR-MCNC: 8.7 MG/L
MICROALBUMIN/CREAT UR: 4 UG/MG CREAT
MONOCYTES # BLD AUTO: 0.79 X10*3/UL (ref 0.1–1)
MONOCYTES NFR BLD AUTO: 12.5 %
NEUTROPHILS # BLD AUTO: 3.28 X10*3/UL (ref 1.2–7.7)
NEUTROPHILS NFR BLD AUTO: 51.7 %
NITRITE UR QL STRIP.AUTO: NEGATIVE
NON HDL CHOLESTEROL: 92 MG/DL (ref 0–149)
NRBC BLD-RTO: 0 /100 WBCS (ref 0–0)
PH UR STRIP.AUTO: 5 [PH]
PLATELET # BLD AUTO: 234 X10*3/UL (ref 150–450)
POTASSIUM SERPL-SCNC: 4.3 MMOL/L (ref 3.5–5.3)
PROT SERPL-MCNC: 7.1 G/DL (ref 6.4–8.2)
PROT UR STRIP.AUTO-MCNC: NEGATIVE MG/DL
PSA SERPL-MCNC: 0.7 NG/ML
RBC # BLD AUTO: 5.03 X10*6/UL (ref 4.5–5.9)
RBC # UR STRIP.AUTO: NEGATIVE /UL
SODIUM SERPL-SCNC: 141 MMOL/L (ref 136–145)
SP GR UR STRIP.AUTO: 1.03
TRIGL SERPL-MCNC: 114 MG/DL (ref 0–149)
TSH SERPL-ACNC: 1.58 MIU/L (ref 0.44–3.98)
UROBILINOGEN UR STRIP.AUTO-MCNC: NORMAL MG/DL
VLDL: 23 MG/DL (ref 0–40)
WBC # BLD AUTO: 6.3 X10*3/UL (ref 4.4–11.3)

## 2025-01-24 PROCEDURE — 82043 UR ALBUMIN QUANTITATIVE: CPT

## 2025-01-24 PROCEDURE — 80053 COMPREHEN METABOLIC PANEL: CPT

## 2025-01-24 PROCEDURE — 80061 LIPID PANEL: CPT

## 2025-01-24 PROCEDURE — 85025 COMPLETE CBC W/AUTO DIFF WBC: CPT

## 2025-01-24 PROCEDURE — 81003 URINALYSIS AUTO W/O SCOPE: CPT

## 2025-01-24 PROCEDURE — G0103 PSA SCREENING: HCPCS

## 2025-01-24 PROCEDURE — 82570 ASSAY OF URINE CREATININE: CPT

## 2025-01-24 PROCEDURE — 84443 ASSAY THYROID STIM HORMONE: CPT

## 2025-02-17 DIAGNOSIS — E78.2 MIXED HYPERLIPIDEMIA: Chronic | ICD-10-CM

## 2025-02-17 RX ORDER — ATORVASTATIN CALCIUM 40 MG/1
40 TABLET, FILM COATED ORAL DAILY
Qty: 90 TABLET | Refills: 1 | Status: SHIPPED | OUTPATIENT
Start: 2025-02-17

## 2025-04-04 DIAGNOSIS — E78.2 MIXED HYPERLIPIDEMIA: Chronic | ICD-10-CM

## 2025-04-04 DIAGNOSIS — E11.9 TYPE 2 DIABETES MELLITUS WITHOUT COMPLICATION, WITHOUT LONG-TERM CURRENT USE OF INSULIN: ICD-10-CM

## 2025-04-04 DIAGNOSIS — I10 PRIMARY HYPERTENSION: Chronic | ICD-10-CM

## 2025-04-04 RX ORDER — METFORMIN HYDROCHLORIDE 500 MG/1
1000 TABLET, EXTENDED RELEASE ORAL
Qty: 180 TABLET | Refills: 1 | Status: SHIPPED | OUTPATIENT
Start: 2025-04-04

## 2025-04-04 RX ORDER — IRBESARTAN 150 MG/1
150 TABLET ORAL DAILY
Qty: 90 TABLET | Refills: 1 | Status: SHIPPED | OUTPATIENT
Start: 2025-04-04

## 2025-04-04 RX ORDER — ATORVASTATIN CALCIUM 40 MG/1
40 TABLET, FILM COATED ORAL DAILY
Qty: 90 TABLET | Refills: 1 | Status: SHIPPED | OUTPATIENT
Start: 2025-04-04

## 2025-04-04 RX ORDER — GLIMEPIRIDE 2 MG/1
2 TABLET ORAL
Qty: 180 TABLET | Refills: 1 | Status: SHIPPED | OUTPATIENT
Start: 2025-04-04

## 2025-06-03 ENCOUNTER — OFFICE VISIT (OUTPATIENT)
Dept: PRIMARY CARE | Facility: CLINIC | Age: 65
End: 2025-06-03
Payer: COMMERCIAL

## 2025-06-03 VITALS
WEIGHT: 229.4 LBS | DIASTOLIC BLOOD PRESSURE: 72 MMHG | BODY MASS INDEX: 32.92 KG/M2 | OXYGEN SATURATION: 98 % | HEART RATE: 81 BPM | SYSTOLIC BLOOD PRESSURE: 134 MMHG | TEMPERATURE: 97.1 F

## 2025-06-03 DIAGNOSIS — I10 PRIMARY HYPERTENSION: Chronic | ICD-10-CM

## 2025-06-03 DIAGNOSIS — E78.2 MIXED HYPERLIPIDEMIA: Chronic | ICD-10-CM

## 2025-06-03 DIAGNOSIS — Z23 NEED FOR TDAP VACCINATION: ICD-10-CM

## 2025-06-03 DIAGNOSIS — E11.9 TYPE 2 DIABETES MELLITUS WITHOUT COMPLICATION, WITHOUT LONG-TERM CURRENT USE OF INSULIN: Primary | ICD-10-CM

## 2025-06-03 LAB — POC HEMOGLOBIN A1C: 7.7 % (ref 4.2–6.5)

## 2025-06-03 PROCEDURE — 90715 TDAP VACCINE 7 YRS/> IM: CPT | Performed by: FAMILY MEDICINE

## 2025-06-03 PROCEDURE — 90471 IMMUNIZATION ADMIN: CPT | Performed by: FAMILY MEDICINE

## 2025-06-03 PROCEDURE — 3048F LDL-C <100 MG/DL: CPT | Performed by: FAMILY MEDICINE

## 2025-06-03 PROCEDURE — 1036F TOBACCO NON-USER: CPT | Performed by: FAMILY MEDICINE

## 2025-06-03 PROCEDURE — 3075F SYST BP GE 130 - 139MM HG: CPT | Performed by: FAMILY MEDICINE

## 2025-06-03 PROCEDURE — 3051F HG A1C>EQUAL 7.0%<8.0%: CPT | Performed by: FAMILY MEDICINE

## 2025-06-03 PROCEDURE — 4010F ACE/ARB THERAPY RXD/TAKEN: CPT | Performed by: FAMILY MEDICINE

## 2025-06-03 PROCEDURE — 99214 OFFICE O/P EST MOD 30 MIN: CPT | Performed by: FAMILY MEDICINE

## 2025-06-03 PROCEDURE — 3061F NEG MICROALBUMINURIA REV: CPT | Performed by: FAMILY MEDICINE

## 2025-06-03 PROCEDURE — 3078F DIAST BP <80 MM HG: CPT | Performed by: FAMILY MEDICINE

## 2025-06-03 PROCEDURE — 83036 HEMOGLOBIN GLYCOSYLATED A1C: CPT | Performed by: FAMILY MEDICINE

## 2025-06-03 RX ORDER — METFORMIN HYDROCHLORIDE 500 MG/1
1000 TABLET, EXTENDED RELEASE ORAL
Qty: 360 TABLET | Refills: 1 | Status: SHIPPED | OUTPATIENT
Start: 2025-06-03

## 2025-06-03 ASSESSMENT — PATIENT HEALTH QUESTIONNAIRE - PHQ9
1. LITTLE INTEREST OR PLEASURE IN DOING THINGS: NOT AT ALL
SUM OF ALL RESPONSES TO PHQ9 QUESTIONS 1 AND 2: 0
2. FEELING DOWN, DEPRESSED OR HOPELESS: NOT AT ALL

## 2025-06-03 ASSESSMENT — ENCOUNTER SYMPTOMS
MUSCULOSKELETAL NEGATIVE: 1
RESPIRATORY NEGATIVE: 1
CONSTITUTIONAL NEGATIVE: 1
ALLERGIC/IMMUNOLOGIC NEGATIVE: 1
EYES NEGATIVE: 1
ENDOCRINE NEGATIVE: 1
PSYCHIATRIC NEGATIVE: 1
NEUROLOGICAL NEGATIVE: 1
GASTROINTESTINAL NEGATIVE: 1

## 2025-06-03 ASSESSMENT — PAIN SCALES - GENERAL: PAINLEVEL_OUTOF10: 0-NO PAIN

## 2025-06-03 NOTE — PROGRESS NOTES
Subjective   Patient ID: Felice Mckenna is a 64 y.o. male who presents for Diabetes.    HPI   DM2:   -Chronic , stable  -A1c today:  7.7  - Frequency of blood glucose monitoring:doesn't check  -Hypoglycemic episodes: infrequent  -Concerns:  none  -Tolerating meds: yes  -Eye Exam: (recommend yearly)  -Foot Exam: (recommend yearly)  Dyslipidemia  -Patient presents for follow up chronic ,stable problem  -Concerns: none   -Treatment: Atorvastatin 40 mg  Hypertension :   -Patient is here for follow-up hypertension: chronic , stable  -Tolerating medications without side effects        Review of Systems   Constitutional: Negative.    HENT: Negative.     Eyes: Negative.    Respiratory: Negative.     Gastrointestinal: Negative.    Endocrine: Negative.    Genitourinary: Negative.    Musculoskeletal: Negative.    Skin: Negative.    Allergic/Immunologic: Negative.    Neurological: Negative.    Psychiatric/Behavioral: Negative.         Objective   /72   Pulse 81   Temp 36.2 °C (97.1 °F) (Temporal)   Wt 104 kg (229 lb 6.4 oz)   SpO2 98%   BMI 32.92 kg/m²     Physical Exam  Vitals reviewed.   Constitutional:       Appearance: Normal appearance.   Cardiovascular:      Rate and Rhythm: Normal rate and regular rhythm.      Heart sounds: Normal heart sounds. No murmur heard.  Pulmonary:      Breath sounds: Normal breath sounds.   Abdominal:      General: Abdomen is flat. Bowel sounds are normal.      Palpations: Abdomen is soft.   Musculoskeletal:         General: No swelling.   Neurological:      Mental Status: He is alert.         Assessment/Plan   Diagnoses and all orders for this visit:  Type 2 diabetes mellitus without complication, without long-term current use of insulin  -     POCT glycosylated hemoglobin (Hb A1C) manually resulted  -     metFORMIN  mg 24 hr tablet; Take 2 tablets (1,000 mg) by mouth 2 times daily (morning and late afternoon).  -     Comprehensive Metabolic Panel; Future  Primary  hypertension  Mixed hyperlipidemia  Need for Tdap vaccination  -     Tdap vaccine, age 7 years and older  Other orders  -     Follow Up In Primary Care - Health Maintenance; Future  Cont meds  Increase metformin 1000 mg bid  Rto 6mo

## 2025-06-06 ENCOUNTER — PATIENT MESSAGE (OUTPATIENT)
Dept: PRIMARY CARE | Facility: CLINIC | Age: 65
End: 2025-06-06
Payer: COMMERCIAL

## 2025-06-06 DIAGNOSIS — E11.9 TYPE 2 DIABETES MELLITUS WITHOUT COMPLICATION, WITHOUT LONG-TERM CURRENT USE OF INSULIN: Primary | Chronic | ICD-10-CM

## 2025-06-06 RX ORDER — BLOOD SUGAR DIAGNOSTIC
1 STRIP MISCELLANEOUS DAILY
Qty: 100 EACH | Refills: 1 | Status: SHIPPED | OUTPATIENT
Start: 2025-06-06

## 2025-06-06 RX ORDER — BLOOD SUGAR DIAGNOSTIC
STRIP MISCELLANEOUS
COMMUNITY
End: 2025-06-06 | Stop reason: SDUPTHER

## 2025-07-04 LAB
ALBUMIN SERPL-MCNC: 4.8 G/DL (ref 3.6–5.1)
ALP SERPL-CCNC: 86 U/L (ref 35–144)
ALT SERPL-CCNC: 28 U/L (ref 9–46)
ANION GAP SERPL CALCULATED.4IONS-SCNC: 9 MMOL/L (CALC) (ref 7–17)
AST SERPL-CCNC: 26 U/L (ref 10–35)
BILIRUB SERPL-MCNC: 0.6 MG/DL (ref 0.2–1.2)
BUN SERPL-MCNC: 18 MG/DL (ref 7–25)
CALCIUM SERPL-MCNC: 10.3 MG/DL (ref 8.6–10.3)
CHLORIDE SERPL-SCNC: 102 MMOL/L (ref 98–110)
CO2 SERPL-SCNC: 27 MMOL/L (ref 20–32)
CREAT SERPL-MCNC: 0.93 MG/DL (ref 0.7–1.35)
EGFRCR SERPLBLD CKD-EPI 2021: 92 ML/MIN/1.73M2
GLUCOSE SERPL-MCNC: 128 MG/DL (ref 65–139)
POTASSIUM SERPL-SCNC: 4.8 MMOL/L (ref 3.5–5.3)
PROT SERPL-MCNC: 7.3 G/DL (ref 6.1–8.1)
SODIUM SERPL-SCNC: 138 MMOL/L (ref 135–146)